# Patient Record
Sex: MALE | Race: OTHER | HISPANIC OR LATINO | URBAN - METROPOLITAN AREA
[De-identification: names, ages, dates, MRNs, and addresses within clinical notes are randomized per-mention and may not be internally consistent; named-entity substitution may affect disease eponyms.]

---

## 2020-05-10 ENCOUNTER — EMERGENCY (EMERGENCY)
Facility: HOSPITAL | Age: 45
LOS: 0 days | Discharge: HOME | End: 2020-05-10
Attending: EMERGENCY MEDICINE | Admitting: EMERGENCY MEDICINE
Payer: MEDICAID

## 2020-05-10 VITALS
RESPIRATION RATE: 18 BRPM | TEMPERATURE: 97 F | WEIGHT: 240.08 LBS | HEART RATE: 80 BPM | HEIGHT: 73 IN | DIASTOLIC BLOOD PRESSURE: 104 MMHG | SYSTOLIC BLOOD PRESSURE: 166 MMHG | OXYGEN SATURATION: 99 %

## 2020-05-10 VITALS
RESPIRATION RATE: 18 BRPM | SYSTOLIC BLOOD PRESSURE: 147 MMHG | OXYGEN SATURATION: 98 % | DIASTOLIC BLOOD PRESSURE: 90 MMHG | TEMPERATURE: 97 F | HEART RATE: 74 BPM

## 2020-05-10 DIAGNOSIS — F10.129 ALCOHOL ABUSE WITH INTOXICATION, UNSPECIFIED: ICD-10-CM

## 2020-05-10 DIAGNOSIS — I10 ESSENTIAL (PRIMARY) HYPERTENSION: ICD-10-CM

## 2020-05-10 DIAGNOSIS — F17.200 NICOTINE DEPENDENCE, UNSPECIFIED, UNCOMPLICATED: ICD-10-CM

## 2020-05-10 PROCEDURE — 99284 EMERGENCY DEPT VISIT MOD MDM: CPT

## 2020-05-10 NOTE — ED PROVIDER NOTE - CLINICAL SUMMARY MEDICAL DECISION MAKING FREE TEXT BOX
Pt found intoxicated, no marks of trauma, vss and euglycemic, observed, discharged with clear speech and steady gait after eating breakfast
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
## 2020-05-10 NOTE — ED ADULT NURSE NOTE - NSIMPLEMENTINTERV_GEN_ALL_ED
Implemented All Fall Risk Interventions:  [place] to call system. Call bell, personal items and telephone within reach. Instruct patient to call for assistance. Room bathroom lighting operational. Non-slip footwear when patient is off stretcher. Physically safe environment: no spills, clutter or unnecessary equipment. Stretcher in lowest position, wheels locked, appropriate side rails in place. Provide visual cue, wrist band, yellow gown, etc. Monitor gait and stability. Monitor for mental status changes and reorient to person, place, and time. Review medications for side effects contributing to fall risk. Reinforce activity limits and safety measures with patient and family.
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
## 2020-05-10 NOTE — ED PROVIDER NOTE - PATIENT PORTAL LINK FT
You can access the FollowMyHealth Patient Portal offered by [facility] by registering at the following website: [email or web address]. By joining [employer]’s FollowMyHealth portal, you will also be able to view your health information using other applications (apps) compatible with our system.

## 2020-05-10 NOTE — ED PROVIDER NOTE - PROGRESS NOTE DETAILS
Signed out to Dr. De La Fuente. Re-assess. AA: Tolerated po, ambulated in ED to use restroom. Will dc

## 2020-05-10 NOTE — ED PROVIDER NOTE - NSFOLLOWUPCLINICS_GEN_ALL_ED_FT
[facility]  Detox Mgmt  [address] [place] [address]  Phone: [phone]  Fax:   Follow Up Time: 1-3 Days

## 2020-05-10 NOTE — ED PROVIDER NOTE - PHYSICAL EXAMINATION
GEN: Alert & Oriented x 3, No acute distress. Calm, appropriate.  Head and Neck: Normocephalic, atraumatic.  Eyes: PERRL. No conjunctival injection. No scleral icterus.   RESP: Lungs clear to auscult bilat. no wheezes, rhonchi or rales. No retractions. Equal air entry.  CARDIO: regular rate and rhythm, no murmurs, rubs or gallops. Normal S1, S2. Radial pulses 2+ bilaterally. No lower extremity edema.  ABD: Soft, Nondistended. No rebound tenderness/guarding. No pulsatile mass. No tenderness with palpation x 4 quadrants.  MS: moving all extremities. no obvious swelling or deformities.   SKIN: no rashes/lesions, no petechiae, no ecchymosis.  NEURO: CN II-XII grossly intact. Strength + sensation intact x 4 extremities. Speech and cognition normal.

## 2020-05-10 NOTE — ED PROVIDER NOTE - NS ED ROS FT
GEN: (-) fever, (-)chils, (-) malaise  HEENT: (-) vision changes, (-) HA, (-) sore throat  CV: (-) chest pain, (-) palpitations, (-) edema  PULM: (-) cough, (-) wheezing, (-) dyspnea  GI: (-) abdominal pain,(-) Nausea, (-) Vomiting, (-) Diarrhea  NEURO: (-) weakness, (-) paresthesias, (-) syncope, (-) seizure  : (-) dysuria, (-) frequency, (-) urgency  MS: (-) back pain, (-) joint pain, (-)myalgias, (-) swelling  SKIN: (-) rashes, (-) new lesions  HEME: (-) bleeding, (-) ecchymosis  PSYCH: (-) SI, (-) HI

## 2020-05-27 ENCOUNTER — EMERGENCY (EMERGENCY)
Facility: HOSPITAL | Age: 45
LOS: 0 days | Discharge: HOME | End: 2020-05-27
Attending: EMERGENCY MEDICINE | Admitting: EMERGENCY MEDICINE
Payer: MEDICAID

## 2020-05-27 VITALS
SYSTOLIC BLOOD PRESSURE: 135 MMHG | RESPIRATION RATE: 18 BRPM | OXYGEN SATURATION: 98 % | TEMPERATURE: 98 F | DIASTOLIC BLOOD PRESSURE: 90 MMHG | HEART RATE: 86 BPM
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
## 2020-05-27 DIAGNOSIS — Z23 ENCOUNTER FOR IMMUNIZATION: ICD-10-CM

## 2020-05-27 DIAGNOSIS — S82.892A OTHER FRACTURE OF LEFT LOWER LEG, INITIAL ENCOUNTER FOR CLOSED FRACTURE: ICD-10-CM

## 2020-05-27 DIAGNOSIS — M25.579 PAIN IN UNSPECIFIED ANKLE AND JOINTS OF UNSPECIFIED FOOT: ICD-10-CM

## 2020-05-27 DIAGNOSIS — S80.212A ABRASION, LEFT KNEE, INITIAL ENCOUNTER: ICD-10-CM

## 2020-05-27 DIAGNOSIS — I10 ESSENTIAL (PRIMARY) HYPERTENSION: ICD-10-CM
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
## 2020-05-27 DIAGNOSIS — F17.200 NICOTINE DEPENDENCE, UNSPECIFIED, UNCOMPLICATED: ICD-10-CM
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
## 2020-05-27 PROCEDURE — 73620 X-RAY EXAM OF FOOT: CPT | Mod: 26,LT

## 2020-05-27 PROCEDURE — 73610 X-RAY EXAM OF ANKLE: CPT | Mod: 26,LT

## 2020-05-27 PROCEDURE — 29515 APPLICATION SHORT LEG SPLINT: CPT

## 2020-05-27 PROCEDURE — 73590 X-RAY EXAM OF LOWER LEG: CPT | Mod: 26,LT

## 2020-05-27 PROCEDURE — 99284 EMERGENCY DEPT VISIT MOD MDM: CPT | Mod: 25

## 2020-05-27 RX ORDER — IBUPROFEN 200 MG
600 TABLET ORAL ONCE
Refills: 0 | Status: COMPLETED | OUTPATIENT
Start: 2020-05-27 | End: 2020-05-27

## 2020-05-27 RX ORDER — TETANUS TOXOID, REDUCED DIPHTHERIA TOXOID AND ACELLULAR PERTUSSIS VACCINE, ADSORBED 5; 2.5; 8; 8; 2.5 [IU]/.5ML; [IU]/.5ML; UG/.5ML; UG/.5ML; UG/.5ML
0.5 SUSPENSION INTRAMUSCULAR ONCE
Refills: 0 | Status: COMPLETED | OUTPATIENT
Start: 2020-05-27 | End: 2020-05-27

## 2020-05-27 RX ADMIN — TETANUS TOXOID, REDUCED DIPHTHERIA TOXOID AND ACELLULAR PERTUSSIS VACCINE, ADSORBED 0.5 MILLILITER(S): 5; 2.5; 8; 8; 2.5 SUSPENSION INTRAMUSCULAR at 18:12

## 2020-05-27 RX ADMIN — Medication 600 MILLIGRAM(S): at 18:17

## 2020-05-27 RX ADMIN — Medication 1 TABLET(S): at 18:12

## 2020-05-27 NOTE — ED PROVIDER NOTE - PATIENT PORTAL LINK FT
You can access the FollowMyHealth Patient Portal offered by [facility] by registering at the following website: [email or web address]. By joining [employer]’s FollowMyHealth portal, you will also be able to view your health information using other applications (apps) compatible with our system.

## 2020-05-27 NOTE — ED PROVIDER NOTE - ATTENDING CONTRIBUTION TO CARE
43 yo M presents with c/o left ankle fracture.  Was seen at [facility] and found to have fracture,  Was sent here for further eval.  Pt says he fell 6 days ago.  On exam pt in NAD AAo x 3, + swelling with tenderness to left ankle, + abrasions noted, knee non tender

## 2020-05-27 NOTE — ED ADULT TRIAGE NOTE - CHIEF COMPLAINT QUOTE
Respiratory Therapist Note:    Vest    Brand: Hill-Rom - traditional Hill Rom: Frequencies 8, 9, 10 at pressure 10 then frequencies 18, 19, 20 at pressure 6.   Cough Pause: Cough Pause; Yes   Vest Garment Size: Adult Large   Last Fitting Date: winter 2019, due as needed with significant weight changes   Frequency of therapy: 14 times per week, rarely misses. Will do an extra nebulizer for respiratory illness. Discussed concern to incorporate additional vest therapy also with respiratory illness.    Concerns: none. Patient ordered new hoses at home. Has second vest jacket at home for visits with starting college.     Exercise (purposeful and aerobic for >20 minutes each session): Yes - amount : cardio 20 min / week with college friends & taking fitness class in college quarter 2.    Does this qualify as additional airway clearance: Yes    Alternative Airway Clearance: [employer] (uses prn for travel 2 x year)      Nebulized Medications   Bronchodilators: Albuterol   Mucolytic: Pulmozyme   Antibiotics: KYLEE   Additional Inhaled Medications: ICS- flovent   Spacer Use: yes    Review Cleaning: Yes. Soap and boil at school,  at home, may purchase steamer    Education and Transition Information   Correct order of inhaled medications: Yes   Mechanism of Action of inhaled medications: Yes   Frequency of inhaled medications: Yes   Dosage of inhaled medications: Yes   Other: discussed steamer use. Patient may purchase.    Home Care:   Nebulizer Cups (Brand/Type): Honey- adequate supplies   Nebulizer Compressor    Year Purchased: 50 psi, working & small white machine.    [facility], Phone: [phone], Fax: [phone]   Nebulizer Supply Company:     [facility], Phone: [phone], Fax: [phone]        Plan of Care and Goals for next visit:  Awesome job working hard at airway clearance. Keep up the great work! If interested in the [employer] vest [facility] (1  pt c/o ankle pain week ahead of time).RT contact given.

## 2020-05-27 NOTE — ED PROVIDER NOTE - PHYSICAL EXAMINATION
--EXAM--  VITAL SIGNS: I have reviewed vs documented at present.  CONSTITUTIONAL: Well-developed; well-nourished; in no acute distress.   SKIN: Warm and dry, no acute rash.   HEAD: Normocephalic; atraumatic.  EYES: PERRL, EOM intact; conjunctiva and sclera clear. No nystagmus.  ENT: No nasal discharge; airway clear.  NECK: Supple; non tender.  CARD: S1, S2, Regular rate and rhythm.   RESP: No wheezes, rales or rhonchi.  ABD: Normal bowel sounds; soft; non-distended; non-tender.  EXT: ankle pain there is swellng to left ankle with tenderness.   there are healing abrasions to left leg there is mild surrounding erythema  no calf tenderness   NEURO: Alert, oriented, grossly unremarkable. Strength 5/5 in all extremities. Sensation intact throughout.  PSYCH: Cooperative, appropriate.

## 2020-05-27 NOTE — ED PROVIDER NOTE - PROGRESS NOTE DETAILS
spoke to urgent care they sent patient for ortho. patient was not placed in splint . patient walked into ed .   patient states he was told ortho could see him . I confirmed this with urgent care.  they are aware that I will place patient in splint and sent him for outpt ortho followup patient is aware of plan will folllow with ortho

## 2020-05-27 NOTE — ED PROVIDER NOTE - CLINICAL SUMMARY MEDICAL DECISION MAKING FREE TEXT BOX
x ray results reviewed with pt . Will place splint, Rec ice, elevate and follow up with Ortho. Pt verbalizes understanding. Crutches given.

## 2020-05-27 NOTE — ED PROVIDER NOTE - CARE PROVIDER_API CALL
Trevor Springer  Orthopaedic Surgery  [address] [place] [address]  Phone: [phone]  Fax: [phone]  Follow Up Time:     Helio Reyes  Orthopaedic Surgery  [address] [place] [address]  Phone: [phone]  Fax: [phone]  Follow Up Time:

## 2020-05-27 NOTE — ED PROVIDER NOTE - OBJECTIVE STATEMENT
this is 45 yo male presents to ed for evaluation of ankle pain to left ankle after fall 6 days ago.  patient went to urgent care . patient was told to come to ed for further evaluation.

## 2020-05-27 NOTE — ED PROVIDER NOTE - NS ED ROS FT
Review of Systems:  	•	CONSTITUTIONAL - no fever, no diaphoresis, no chills  	•	SKIN - no rash  	•	HEMATOLOGIC - no bleeding, no bruising  	•	EYES - no eye pain, no blurry vision  	•	ENT - no change in hearing, no sore throat, no ear pain or tinnitus  	•	RESPIRATORY - no shortness of breath, no cough  	•	CARDIAC - no chest pain, no palpitations  	•	GI - no abd pain, no nausea, no vomiting, no diarrhea, no constipation  	•	GENITO-URINARY - no discharge, no dysuria; no hematuria, no increased urinary frequency  	•	MUSCULOSKELETAL - left ankle pain , no swelling, no redness  	•	NEUROLOGIC - no weakness, no headache, no paresthesias, no LOC  	•	PSYCH - no anxiety, non suicidal, non homicidal, no hallucination, no depression

## 2020-06-01 ENCOUNTER — OUTPATIENT (OUTPATIENT)
Dept: OUTPATIENT SERVICES | Facility: HOSPITAL | Age: 45
LOS: 1 days | End: 2020-06-01
Payer: MEDICAID

## 2020-06-05 ENCOUNTER — EMERGENCY (EMERGENCY)
Facility: HOSPITAL | Age: 45
LOS: 0 days | Discharge: HOME | End: 2020-06-05
Attending: EMERGENCY MEDICINE | Admitting: EMERGENCY MEDICINE
Payer: MEDICAID

## 2020-06-05 VITALS
WEIGHT: 240.08 LBS | RESPIRATION RATE: 16 BRPM | OXYGEN SATURATION: 97 % | TEMPERATURE: 98 F | HEART RATE: 87 BPM | DIASTOLIC BLOOD PRESSURE: 116 MMHG | SYSTOLIC BLOOD PRESSURE: 185 MMHG | HEIGHT: 74 IN
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
## 2020-06-05 DIAGNOSIS — S82.402A UNSPECIFIED FRACTURE OF SHAFT OF LEFT FIBULA, INITIAL ENCOUNTER FOR CLOSED FRACTURE: ICD-10-CM
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
## 2020-06-05 PROCEDURE — 99284 EMERGENCY DEPT VISIT MOD MDM: CPT

## 2020-06-05 NOTE — ED PROVIDER NOTE - CARE PROVIDERS DIRECT ADDRESSES
[email or web address]

## 2020-06-05 NOTE — ED PROVIDER NOTE - NS ED ROS FT
Review of Systems:  •	CONSTITUTIONAL - No fever, No diaphoresis, No weight change  •	SKIN - No rash  •	HEMATOLOGIC - No abnormal bleeding or bruising  •	EYES - No eye pain, No blurred vision  •	ENT - No change in hearing, No sore throat, No neck pain, No rhinorrhea, No ear pain  •	RESPIRATORY - No shortness of breath, No cough  •	CARDIAC -No chest pain, No palpitations  •	GI - No abdominal pain, No nausea, No vomiting, No diarrhea, No constipation, No bright red blood per rectum or melena. No flank pain  •                 - No dysuria, frequency, hematuria.   •	ENDO - No polydypsia, No polyuria, No heat/cold intolerance  •	MUSCULOSKELETAL - + ankle pain, No swelling, No back pain  •	NEUROLOGIC - No numbness, No focal weakness, No headache, No dizziness  All other systems negative, unless specified in HPI

## 2020-06-05 NOTE — ED PROVIDER NOTE - CLINICAL SUMMARY MEDICAL DECISION MAKING FREE TEXT BOX
In my opinion, out patient treatment and follow up are appropriate. specific f/u arranged in anticipation of operative intervention.

## 2020-06-05 NOTE — ED PROVIDER NOTE - OBJECTIVE STATEMENT
45 y/o male with PMH of HTN who presents to ED for left fibula fracture. PT states he has been having difficulty following up with orthopedist and was told to come back to ED for ortho eval. No numbness/tingling or weakness.

## 2020-06-05 NOTE — ED PROVIDER NOTE - PHYSICAL EXAMINATION
CONSTITUTIONAL: Well-developed; well-nourished; in no acute distress.   SKIN: warm, dry  HEAD: Normocephalic; atraumatic.  EYES: no conjunctival injection. PERRL.   ENT: No nasal discharge; airway clear.  NECK: Supple; non tender.  CARD: S1, S2 normal; no murmurs, gallops, or rubs. Regular rate and rhythm.   RESP: No wheezes, rales or rhonchi.  ABD: soft ntnd  EXT: LLE: + cast in palce. othwerwise Normal ROM.  No clubbing, cyanosis or edema.   LYMPH: No acute cervical adenopathy.  NEURO: Alert, oriented, grossly unremarkable  PSYCH: Cooperative, appropriate.

## 2020-06-05 NOTE — ED PROVIDER NOTE - PATIENT PORTAL LINK FT
You can access the FollowMyHealth Patient Portal offered by [facility] by registering at the following website: [email or web address]. By joining [employer]’s FollowMyHealth portal, you will also be able to view your health information using other applications (apps) compatible with our system.

## 2020-06-05 NOTE — ED PROVIDER NOTE - CARE PROVIDER_API CALL
BECCA KWON  ORTHOPAEDIC SURGERY  [address] [place] [address]  Phone: [phone]  Fax: [phone]  Follow Up Time: 1-3 Days

## 2020-06-05 NOTE — ED PROVIDER NOTE - NSFOLLOWUPINSTRUCTIONS_ED_ALL_ED_FT
Declined
Dr. Lu will call you to discuss surgery which will be on Thursday.       Fracture    A fracture is a break in one of your bones. This can occur from a variety of injuries, especially traumatic ones. Symptoms include pain, bruising, or swelling. Do not use the injured limb. If a fracture is in one of the bones below your waist, do not put weight on that limb unless instructed to do so by your healthcare provider. Crutches or a cane may have been provided. A splint or cast may have been applied by your health care provider. Make sure to keep it dry and follow up with an orthopedist as instructed.    SEEK IMMEDIATE MEDICAL CARE IF YOU HAVE ANY OF THE FOLLOWING SYMPTOMS: numbness, tingling, increasing pain, or weakness in any part of the injured limb.

## 2020-06-08 ENCOUNTER — OUTPATIENT (OUTPATIENT)
Dept: OUTPATIENT SERVICES | Facility: HOSPITAL | Age: 45
LOS: 1 days | Discharge: HOME | End: 2020-06-08
Payer: MEDICAID

## 2020-06-08 VITALS
HEART RATE: 74 BPM | TEMPERATURE: 98 F | OXYGEN SATURATION: 97 % | WEIGHT: 244.93 LBS | SYSTOLIC BLOOD PRESSURE: 156 MMHG | DIASTOLIC BLOOD PRESSURE: 96 MMHG | HEIGHT: 74 IN | RESPIRATION RATE: 16 BRPM

## 2020-06-08 DIAGNOSIS — S82.892A OTHER FRACTURE OF LEFT LOWER LEG, INITIAL ENCOUNTER FOR CLOSED FRACTURE: ICD-10-CM

## 2020-06-08 DIAGNOSIS — S82.892D OTHER FRACTURE OF LEFT LOWER LEG, SUBSEQUENT ENCOUNTER FOR CLOSED FRACTURE WITH ROUTINE HEALING: ICD-10-CM

## 2020-06-08 DIAGNOSIS — Z01.818 ENCOUNTER FOR OTHER PREPROCEDURAL EXAMINATION: ICD-10-CM

## 2020-06-08 LAB
ALBUMIN SERPL ELPH-MCNC: 3.9 G/DL — SIGNIFICANT CHANGE UP (ref 3.5–5.2)
ALP SERPL-CCNC: 88 U/L — SIGNIFICANT CHANGE UP (ref 30–115)
ALT FLD-CCNC: 19 U/L — SIGNIFICANT CHANGE UP (ref 0–41)
ANION GAP SERPL CALC-SCNC: 15 MMOL/L — HIGH (ref 7–14)
APPEARANCE UR: CLEAR — SIGNIFICANT CHANGE UP
AST SERPL-CCNC: 16 U/L — SIGNIFICANT CHANGE UP (ref 0–41)
BACTERIA # UR AUTO: NEGATIVE — SIGNIFICANT CHANGE UP
BASOPHILS # BLD AUTO: 0.12 K/UL — SIGNIFICANT CHANGE UP (ref 0–0.2)
BASOPHILS NFR BLD AUTO: 1 % — SIGNIFICANT CHANGE UP (ref 0–1)
BILIRUB SERPL-MCNC: <0.2 MG/DL — SIGNIFICANT CHANGE UP (ref 0.2–1.2)
BILIRUB UR-MCNC: NEGATIVE — SIGNIFICANT CHANGE UP
BUN SERPL-MCNC: 20 MG/DL — SIGNIFICANT CHANGE UP (ref 10–20)
CALCIUM SERPL-MCNC: 9.3 MG/DL — SIGNIFICANT CHANGE UP (ref 8.5–10.1)
CHLORIDE SERPL-SCNC: 102 MMOL/L — SIGNIFICANT CHANGE UP (ref 98–110)
CO2 SERPL-SCNC: 22 MMOL/L — SIGNIFICANT CHANGE UP (ref 17–32)
COLOR SPEC: SIGNIFICANT CHANGE UP
CREAT SERPL-MCNC: 1 MG/DL — SIGNIFICANT CHANGE UP (ref 0.7–1.5)
DIFF PNL FLD: NEGATIVE — SIGNIFICANT CHANGE UP
EOSINOPHIL # BLD AUTO: 0.42 K/UL — SIGNIFICANT CHANGE UP (ref 0–0.7)
EOSINOPHIL NFR BLD AUTO: 3.4 % — SIGNIFICANT CHANGE UP (ref 0–8)
EPI CELLS # UR: 1 /HPF — SIGNIFICANT CHANGE UP (ref 0–5)
GLUCOSE SERPL-MCNC: 96 MG/DL — SIGNIFICANT CHANGE UP (ref 70–99)
GLUCOSE UR QL: NEGATIVE — SIGNIFICANT CHANGE UP
HCT VFR BLD CALC: 43.6 % — SIGNIFICANT CHANGE UP (ref 42–52)
HGB BLD-MCNC: 14.2 G/DL — SIGNIFICANT CHANGE UP (ref 14–18)
HYALINE CASTS # UR AUTO: 1 /LPF — SIGNIFICANT CHANGE UP (ref 0–7)
IMM GRANULOCYTES NFR BLD AUTO: 0.9 % — HIGH (ref 0.1–0.3)
KETONES UR-MCNC: NEGATIVE — SIGNIFICANT CHANGE UP
LEUKOCYTE ESTERASE UR-ACNC: ABNORMAL
LYMPHOCYTES # BLD AUTO: 29.6 % — SIGNIFICANT CHANGE UP (ref 20.5–51.1)
LYMPHOCYTES # BLD AUTO: 3.61 K/UL — HIGH (ref 1.2–3.4)
MCHC RBC-ENTMCNC: 28.7 PG — SIGNIFICANT CHANGE UP (ref 27–31)
MCHC RBC-ENTMCNC: 32.6 G/DL — SIGNIFICANT CHANGE UP (ref 32–37)
MCV RBC AUTO: 88.3 FL — SIGNIFICANT CHANGE UP (ref 80–94)
MONOCYTES # BLD AUTO: 0.81 K/UL — HIGH (ref 0.1–0.6)
MONOCYTES NFR BLD AUTO: 6.6 % — SIGNIFICANT CHANGE UP (ref 1.7–9.3)
NEUTROPHILS # BLD AUTO: 7.14 K/UL — HIGH (ref 1.4–6.5)
NEUTROPHILS NFR BLD AUTO: 58.5 % — SIGNIFICANT CHANGE UP (ref 42.2–75.2)
NITRITE UR-MCNC: NEGATIVE — SIGNIFICANT CHANGE UP
NRBC # BLD: 0 /100 WBCS — SIGNIFICANT CHANGE UP (ref 0–0)
PH UR: 6 — SIGNIFICANT CHANGE UP (ref 5–8)
PLATELET # BLD AUTO: 429 K/UL — HIGH (ref 130–400)
POTASSIUM SERPL-MCNC: 4.7 MMOL/L — SIGNIFICANT CHANGE UP (ref 3.5–5)
POTASSIUM SERPL-SCNC: 4.7 MMOL/L — SIGNIFICANT CHANGE UP (ref 3.5–5)
PROT SERPL-MCNC: 7.5 G/DL — SIGNIFICANT CHANGE UP (ref 6–8)
PROT UR-MCNC: NEGATIVE — SIGNIFICANT CHANGE UP
RBC # BLD: 4.94 M/UL — SIGNIFICANT CHANGE UP (ref 4.7–6.1)
RBC # FLD: 13.9 % — SIGNIFICANT CHANGE UP (ref 11.5–14.5)
RBC CASTS # UR COMP ASSIST: 1 /HPF — SIGNIFICANT CHANGE UP (ref 0–4)
SODIUM SERPL-SCNC: 139 MMOL/L — SIGNIFICANT CHANGE UP (ref 135–146)
SP GR SPEC: 1.02 — SIGNIFICANT CHANGE UP (ref 1.01–1.02)
UROBILINOGEN FLD QL: SIGNIFICANT CHANGE UP
WBC # BLD: 12.21 K/UL — HIGH (ref 4.8–10.8)
WBC # FLD AUTO: 12.21 K/UL — HIGH (ref 4.8–10.8)
WBC UR QL: 18 /HPF — HIGH (ref 0–5)

## 2020-06-08 PROCEDURE — 93010 ELECTROCARDIOGRAM REPORT: CPT

## 2020-06-08 NOTE — H&P PST ADULT - NSICDXPASTMEDICALHX_GEN_ALL_CORE_FT
PAST MEDICAL HISTORY:  Eye problem right eye accident age 2 "lost vision"    Hypertension no taking meds for 4-5 months PAST MEDICAL HISTORY:  Eye problem right eye accident age 2 "lost vision"    Hypertension not taking meds for 4-5 months "i ran out"
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
## 2020-06-09 DIAGNOSIS — Z71.89 OTHER SPECIFIED COUNSELING: ICD-10-CM

## 2020-06-09 PROBLEM — I10 ESSENTIAL (PRIMARY) HYPERTENSION: Chronic | Status: ACTIVE | Noted: 2020-05-10

## 2020-06-15 PROBLEM — H57.9 UNSPECIFIED DISORDER OF EYE AND ADNEXA: Chronic | Status: ACTIVE | Noted: 2020-06-08

## 2020-06-15 PROCEDURE — G9001: CPT

## 2020-06-17 NOTE — ASU PATIENT PROFILE, ADULT - PMH
Ankle fracture  left  Eye problem  right eye accident age 2 "lost vision"  Hypertension  not taking meds for 4-5 months "i ran out"

## 2020-06-18 ENCOUNTER — OUTPATIENT (OUTPATIENT)
Dept: OUTPATIENT SERVICES | Facility: HOSPITAL | Age: 45
LOS: 1 days | Discharge: HOME | End: 2020-06-18

## 2020-06-18 VITALS
TEMPERATURE: 98 F | RESPIRATION RATE: 95 BRPM | HEART RATE: 69 BPM | SYSTOLIC BLOOD PRESSURE: 124 MMHG | OXYGEN SATURATION: 98 % | DIASTOLIC BLOOD PRESSURE: 62 MMHG

## 2020-06-18 VITALS
OXYGEN SATURATION: 98 % | DIASTOLIC BLOOD PRESSURE: 87 MMHG | HEIGHT: 74 IN | TEMPERATURE: 99 F | WEIGHT: 244.93 LBS | RESPIRATION RATE: 20 BRPM | SYSTOLIC BLOOD PRESSURE: 140 MMHG | HEART RATE: 89 BPM

## 2020-06-18 DIAGNOSIS — Z90.49 ACQUIRED ABSENCE OF OTHER SPECIFIED PARTS OF DIGESTIVE TRACT: Chronic | ICD-10-CM

## 2020-06-18 DIAGNOSIS — Z98.890 OTHER SPECIFIED POSTPROCEDURAL STATES: Chronic | ICD-10-CM

## 2020-06-18 RX ORDER — HYDROMORPHONE HYDROCHLORIDE 2 MG/ML
0.5 INJECTION INTRAMUSCULAR; INTRAVENOUS; SUBCUTANEOUS
Refills: 0 | Status: DISCONTINUED | OUTPATIENT
Start: 2020-06-18 | End: 2020-06-18

## 2020-06-18 RX ORDER — ONDANSETRON 8 MG/1
4 TABLET, FILM COATED ORAL ONCE
Refills: 0 | Status: DISCONTINUED | OUTPATIENT
Start: 2020-06-18 | End: 2020-07-03

## 2020-06-18 RX ORDER — CEPHALEXIN 500 MG
1 CAPSULE ORAL
Qty: 28 | Refills: 0
Start: 2020-06-18 | End: 2020-06-24

## 2020-06-18 RX ORDER — OXYCODONE HYDROCHLORIDE 5 MG/1
5 TABLET ORAL ONCE
Refills: 0 | Status: DISCONTINUED | OUTPATIENT
Start: 2020-06-18 | End: 2020-06-18

## 2020-06-18 RX ORDER — SODIUM CHLORIDE 9 MG/ML
1000 INJECTION, SOLUTION INTRAVENOUS
Refills: 0 | Status: DISCONTINUED | OUTPATIENT
Start: 2020-06-18 | End: 2020-07-03

## 2020-06-18 RX ORDER — ASPIRIN/CALCIUM CARB/MAGNESIUM 324 MG
1 TABLET ORAL
Qty: 60 | Refills: 0
Start: 2020-06-18 | End: 2020-07-17

## 2020-06-18 RX ORDER — IBUPROFEN 200 MG
1 TABLET ORAL
Qty: 90 | Refills: 0
Start: 2020-06-18 | End: 2020-07-17

## 2020-06-18 RX ADMIN — SODIUM CHLORIDE 100 MILLILITER(S): 9 INJECTION, SOLUTION INTRAVENOUS at 12:40

## 2020-06-18 NOTE — ASU DISCHARGE PLAN (ADULT/PEDIATRIC) - CALL YOUR DOCTOR IF YOU HAVE ANY OF THE FOLLOWING:
Pain not relieved by Medications/Fever greater than (need to indicate Fahrenheit or Celsius)/Numbness, tingling, color or temperature change to extremity/Bleeding that does not stop/Swelling that gets worse/Wound/Surgical Site with redness, or foul smelling discharge or pus

## 2020-06-18 NOTE — ASU DISCHARGE PLAN (ADULT/PEDIATRIC) - PROVIDER TOKENS
FREE:[LAST:[brian],FIRST:[juan],PHONE:[phone]],FAX:[(   )    -],ADDRESS:[[address] [place] [address]],FOLLOWUP:[2 weeks],ESTABLISHEDPATIENT:[T]]

## 2020-06-18 NOTE — ASU DISCHARGE PLAN (ADULT/PEDIATRIC) - ASU DC SPECIAL INSTRUCTIONSFT
nonweightbearing left lower extremity.  elevate, ice, pain control.  baby aspirin twice a day for DVT prophylaxis.  1 week of antibiotics.  follow up in 2 weeks for suture removal and xrays.

## 2020-06-18 NOTE — ASU DISCHARGE PLAN (ADULT/PEDIATRIC) - CARE PROVIDER_API CALL
juan torres  [address]  Phone: [phone]  Fax: (   )    -  Established Patient  Follow Up Time: 2 weeks

## 2020-06-18 NOTE — ASU PREOP CHECKLIST - IDENTIFICATION BAND VERIFIED
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  2 mm left and 3mm right cysts immediately adjacent to frontal horns of the lateral ventricles.   head ultrasound recommended. -Bri Luna RNC

## 2020-06-18 NOTE — PRE-ANESTHESIA EVALUATION ADULT - NSANTHOSAYNRD_GEN_A_CORE
No. CHUCKY screening performed.  STOP BANG Legend: 0-2 = LOW Risk; 3-4 = INTERMEDIATE Risk; 5-8 = HIGH Risk/never tested, see screening tool

## 2020-06-18 NOTE — BRIEF OPERATIVE NOTE - NSICDXBRIEFPOSTOP_GEN_ALL_CORE_FT
POST-OP DIAGNOSIS:  Ankle syndesmosis disruption, left, initial encounter 18-Jun-2020 12:16:55  Yolande Lu  Disp fx of lateral malleolus of left fibula, init 18-Jun-2020 12:13:19  Yolande Lu

## 2020-06-18 NOTE — BRIEF OPERATIVE NOTE - NSICDXBRIEFPREOP_GEN_ALL_CORE_FT
PRE-OP DIAGNOSIS:  Disp fx of lateral malleolus of left fibula, init 18-Jun-2020 12:13:12  Yolande Lu

## 2020-06-18 NOTE — BRIEF OPERATIVE NOTE - NSICDXBRIEFPROCEDURE_GEN_ALL_CORE_FT
PROCEDURES:  Repair of syndesmosis of left ankle 18-Jun-2020 12:12:35  Yolande Lu  Open reduction and internal fixation (ORIF) of fracture of lateral malleolus of left fibula 18-Jun-2020 12:12:23  Yolande Lu

## 2020-06-18 NOTE — PACU DISCHARGE NOTE - THE ANESTHESIA ORDERS USED IN THE PACU ORDER SET WILL BE DISCONTINUED UPON TRANSFER OF THIS PATIENT
Called patient to report her labs are improved from last year labs. No changes will be made at this time.  Patient states understanding and is happy to received a call.  =================================================      Component      Latest Ref Rng & Units 2/4/2020   HGB      12.0 - 15.5 g/dL 12.4   HCT      36.0 - 46.5 % 39.2 Statement Selected
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
## 2020-06-23 DIAGNOSIS — F17.210 NICOTINE DEPENDENCE, CIGARETTES, UNCOMPLICATED: ICD-10-CM
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
## 2020-06-23 DIAGNOSIS — I10 ESSENTIAL (PRIMARY) HYPERTENSION: ICD-10-CM

## 2020-06-23 DIAGNOSIS — S82.62XA DISPLACED FRACTURE OF LATERAL MALLEOLUS OF LEFT FIBULA, INITIAL ENCOUNTER FOR CLOSED FRACTURE: ICD-10-CM

## 2020-08-18 NOTE — H&P PST ADULT - PRO TOBACCO QUIT READY
RHEUMATOLOGY FOLLOW UP VISIT    Name: Claudia Wiley  : 1954     Referred by: Dheeraj Garcia MD    CHIEF COMPLAINT   Chief Complaint   Patient presents with   • Lupus     4 month follow-up visit.  No pain reported.       HISTORY OF PRESENT ILLNESS: This is a 66-year-old female history of Sjogren's and lupus. She was initially diagnosed in  in [facility]. At that time, she presented with the symptoms of generalized rash, fever, arthralgia, photosensitivity, malar rash and hair loss. Initial labs showed positive MARY ELLEN with low complements. At that time, anti-Juarez was also positive. In addition, she had positive anti-SSA as well as anti-SSB. Anti-SSB was consistent with diagnosis of Sjogren's syndrome; however, anti-double stranded DNA was negative.  However surprisingly her more recent MARY ELLEN was negative now,   In , she started on Hydroxychloroquine. However, in  ophthalmologist was concerned about possible Plaquenil toxicity and it was therefore discontinued. She was evaluated by another ophthalmologist for second opinion, and he felt that she did not have Hydroxychloroquine toxicity. However, she decided not to take Hydroxychloroquine.  However recently she called me about hydroxychloroquine again after the recent publicity ie trials on Covid 19, so she started taking it again but again stopped it after about a month and she could not get an ophthalmology evaluation.  She has therefore been on prednisone 9 mg since the last few months  Previous attempts to lower the Prednisone had not been successful. She has also been reluctant to start any other medications for rheumatological disorders such as lupus including Methotrexate, Azathioprine or other immunosuppressants. Patient has osteoporosis; however, does not want to undergo any treatment for it. She has a history of heel fracture.  She is not doing fairly well with no significant joint pain or joint swelling as mild achiness and  fatigue.  No malar rash now. No raynauds now.    REVIEW OF SYSTEMS  Constitutional: no fevers or chills no weight loss, has fatigue  EYES: has dry eyes, no blurred vision, no diplopia, no history of iritis  ENT:  has dry mouth, no vision impairment, no oral ulcers, no dysphagia, no chronic sinus disease   Cardiovascular: no chest pain orthopnea no PND no palpitations  Respiratory: no cough no shortness of breath no wheezing no stridor no dyspnea on exertion  GI: no nausea, no vomiting, no diarrhea, no constipation no heartburn  :no dysuria urgency no hematuria  YAMEL: as above no raynauds  Heme:no history of deep venous thrombosis or pulmonary embolism no anemia, no swollen glands  CNS: + tingling + numbness,no weakness, no ataxia  PSY :no anxiety or depression  INTEGUMENTARY: no hair loss, no rashes  ENDO: No polyuria, no polydipsia    Past Medical History:   Diagnosis Date   • Acid reflux    • Breast cancer (CMS/HCC)    • Depression    • Hyperlipidemia    • Hypothyroidism    • Lupus (systemic lupus erythematosus) (CMS/HCC)    • Sjogren's syndrome (CMS/HCC)         Past Surgical History:   Procedure Laterality Date   • Breast surgery     • Hysterectomy         Social History     Tobacco Use   • Smoking status: Never Smoker   • Smokeless tobacco: Never Used   Substance Use Topics   • Alcohol use: No     Frequency: Never   • Drug use: Never       Current Outpatient Medications   Medication Sig Note Last Dose   • levothyroxine 100 MCG tablet Take 1 tablet by mouth daily.  Taking at Unknown time   • predniSONE (DELTASONE) 1 MG tablet Take 4 tablets by mouth daily.  Taking at Unknown time   • predniSONE (DELTASONE) 5 MG tablet Take 1 tablet by mouth daily.  Taking at Unknown time   • doxepin (SINEQUAN) 10 MG capsule 4caps po qhs  Taking at Unknown time   • omeprazole (PRILOSEC) 40 MG capsule Take 1 capsule by mouth daily.  Taking at Unknown time   • cholecalciferol (VITAMIN D3) 1000 UNITS tablet   Taking at Unknown time    • Loratadine (CLARITIN PO)   Taking at Unknown time   • Multiple Vitamins-Minerals (MULTIVITAMIN ADULT PO) Take 1 tablet by mouth.  Taking at Unknown time   • Acetaminophen (TYLENOL PO)   Taking at Unknown time   • desonide (DESOWEN) 0.05 % lotion   Taking at Unknown time   • fluticasone (VERAMYST) 27.5 MCG/SPRAY nasal spray Inhale 2 sprays into the lungs.  Taking at Unknown time   • Multiple Vitamins-Iron (MULTI-DAY PLUS IRON) Tab Take by mouth daily.  Taking at Unknown time   • Cetirizine HCl (ZYRTEC ALLERGY) 10 MG Cap   Taking at Unknown time   • fluticasone (FLONASE) 50 MCG/ACT nasal spray Spray 2 sprays in each nostril daily.  Taking at Unknown time   • DiphenhydrAMINE HCl (BENADRYL PO)  2/18/2019: [facility] med transfer: no issue Taking at Unknown time   • hydroxychloroquine (PLAQUENIL) 200 MG tablet Take 1 tablet by mouth 2 times daily.  Not Taking at Unknown time   • albuterol (PROVENTIL HFA) 108 (90 Base) MCG/ACT inhaler Inhale 2 puffs into the lungs.  Not Taking at Unknown time   • Glycerin-Hypromellose- 0.2-0.2-1 % Solution   Not Taking at Unknown time     No current facility-administered medications for this visit.            PHYSICAL EXAM    Vitals:    08/18/20 1238   BP: 129/86   Pulse: (!) 108   Resp: 16   SpO2: 98%   Weight: 63.2 kg (139 lb 5.3 oz)   Height: 5' 1\" (1.549 m)   PainSc:  0   Cushingoid  Body mass index is 26.33 kg/m².     Skin: no ulcers, no malar rash, no petechia no purpura.    Head and Face: Atraumatic no deformities normocephalic normal facies cushingoid  Eyes: Pink conjunctiva, anicteric sclera, no periorbital swelling, no ptosis, pupils reactive to light, extraocular muscles intact.  has dry eyes.  Puffiness around her eyes    ENT: No oral ulcers, no nasal ulcers,  no sinus tenderness, no malar rash, no temporal artery tenderness, no oral thrush, has dry mouth, no oral ulcers.  No TMJ tenderness     Neck: Fairly good range of motion of C-spine, no paracervical tenderness, no  goiter, no adenopathy, no supraclavicular masses.    Cardiac exam: S1-S2 regular, no murmurs.    Lungs: clear, no rales or wheezing, no abnormal breath sounds, good breath sounds bilaterally.    Abdomen: no hepatomegaly or splenomegaly or tenderness, no masses, no ascites.    Back: no SI joint tenderness, no paralumbar tenderness  Musculoskeletal exam:  Good range of motion bilateral shoulder joint with no tenderness  Bilateral elbows no synovitis or tenderness  Bilateral wrist joints no synovitis or tenderness  Bilateral MCP joints no synovitis or tenderness  Bilateral PIP joints no synovitis and tenderness  Bilateral DIP joints no synovitis or tenderness  Both knees no effusion warmth or tenderness no knee instability  Both ankles no synovitis or tenderness  Bilateral MTP joints no synovitis or tenderness no dactylitis  Good range of motion bilateral hip joints with no tenderness    Neurological exam: Normal gait, normal motor strength in upper and lower extremity, normal muscle tone, no tremors alert oriented x3. good bilateral hand , no muscle atrophy.      LABs  CBC WBC 5.1, hemoglobin 13, platelet count 2 49,000  Urine analysis no proteins  Normal C3 and C4 complements   Negative MARY ELLEN        ASSESSMENT:   SLE   Sjogren's  Osteoporosis but declines treatment  Fibromyalgia-this may also be causing her achiness    PLAN  Labs reviewed all stable  In fact MARY ELLEN now negative  Can start tapering prednisone at least 8 mg daily  If she is doing well in a month and can reduce it further to 7 mg daily  She will make a visit with her ophthalmologist again to make sure there are no contraindications before restarting hydroxychloroquine.  Return to clinic in 6 months       No orders of the defined types were placed in this encounter.        Risks of medical conditions and side effects of medication were discussed.  Medical compliance with plan discussed and risks of non-compliance reviewed.    Patient education completed  on disease process, etiology & prognosis.    Patient expresses understanding of the plan.    Return to clinic as clinically indicated as discussed with patient who verbalized understanding of & agreement with the plan.       Garfield Jimenez MD               not motivated to quit

## 2021-02-06 ENCOUNTER — EMERGENCY (EMERGENCY)
Facility: HOSPITAL | Age: 46
LOS: 0 days | Discharge: HOME | End: 2021-02-07
Attending: EMERGENCY MEDICINE | Admitting: EMERGENCY MEDICINE
Payer: MEDICAID

## 2021-02-06 VITALS
HEART RATE: 97 BPM | SYSTOLIC BLOOD PRESSURE: 147 MMHG | RESPIRATION RATE: 18 BRPM | WEIGHT: 259.93 LBS | DIASTOLIC BLOOD PRESSURE: 89 MMHG | HEIGHT: 74 IN | OXYGEN SATURATION: 97 % | TEMPERATURE: 98 F

## 2021-02-06 DIAGNOSIS — Z90.49 ACQUIRED ABSENCE OF OTHER SPECIFIED PARTS OF DIGESTIVE TRACT: ICD-10-CM

## 2021-02-06 DIAGNOSIS — Z87.81 PERSONAL HISTORY OF (HEALED) TRAUMATIC FRACTURE: ICD-10-CM

## 2021-02-06 DIAGNOSIS — R07.89 OTHER CHEST PAIN: ICD-10-CM

## 2021-02-06 DIAGNOSIS — Z98.890 OTHER SPECIFIED POSTPROCEDURAL STATES: Chronic | ICD-10-CM

## 2021-02-06 DIAGNOSIS — Z90.49 ACQUIRED ABSENCE OF OTHER SPECIFIED PARTS OF DIGESTIVE TRACT: Chronic | ICD-10-CM

## 2021-02-06 DIAGNOSIS — Z79.891 LONG TERM (CURRENT) USE OF OPIATE ANALGESIC: ICD-10-CM

## 2021-02-06 DIAGNOSIS — Z20.822 CONTACT WITH AND (SUSPECTED) EXPOSURE TO COVID-19: ICD-10-CM

## 2021-02-06 DIAGNOSIS — F17.210 NICOTINE DEPENDENCE, CIGARETTES, UNCOMPLICATED: ICD-10-CM

## 2021-02-06 DIAGNOSIS — Z79.899 OTHER LONG TERM (CURRENT) DRUG THERAPY: ICD-10-CM

## 2021-02-06 DIAGNOSIS — I10 ESSENTIAL (PRIMARY) HYPERTENSION: ICD-10-CM

## 2021-02-06 DIAGNOSIS — Z79.82 LONG TERM (CURRENT) USE OF ASPIRIN: ICD-10-CM

## 2021-02-06 DIAGNOSIS — Z98.890 OTHER SPECIFIED POSTPROCEDURAL STATES: ICD-10-CM

## 2021-02-06 DIAGNOSIS — I25.10 ATHEROSCLEROTIC HEART DISEASE OF NATIVE CORONARY ARTERY WITHOUT ANGINA PECTORIS: ICD-10-CM

## 2021-02-06 LAB
ALBUMIN SERPL ELPH-MCNC: 4 G/DL — SIGNIFICANT CHANGE UP (ref 3.5–5.2)
ALP SERPL-CCNC: 80 U/L — SIGNIFICANT CHANGE UP (ref 30–115)
ALT FLD-CCNC: 15 U/L — SIGNIFICANT CHANGE UP (ref 0–41)
ANION GAP SERPL CALC-SCNC: 12 MMOL/L — SIGNIFICANT CHANGE UP (ref 7–14)
AST SERPL-CCNC: 17 U/L — SIGNIFICANT CHANGE UP (ref 0–41)
BASOPHILS # BLD AUTO: 0.07 K/UL — SIGNIFICANT CHANGE UP (ref 0–0.2)
BASOPHILS NFR BLD AUTO: 0.6 % — SIGNIFICANT CHANGE UP (ref 0–1)
BILIRUB SERPL-MCNC: <0.2 MG/DL — SIGNIFICANT CHANGE UP (ref 0.2–1.2)
BUN SERPL-MCNC: 19 MG/DL — SIGNIFICANT CHANGE UP (ref 10–20)
CALCIUM SERPL-MCNC: 9.1 MG/DL — SIGNIFICANT CHANGE UP (ref 8.5–10.1)
CHLORIDE SERPL-SCNC: 103 MMOL/L — SIGNIFICANT CHANGE UP (ref 98–110)
CK SERPL-CCNC: 261 U/L — HIGH (ref 0–225)
CO2 SERPL-SCNC: 24 MMOL/L — SIGNIFICANT CHANGE UP (ref 17–32)
CREAT SERPL-MCNC: 1.1 MG/DL — SIGNIFICANT CHANGE UP (ref 0.7–1.5)
EOSINOPHIL # BLD AUTO: 0.38 K/UL — SIGNIFICANT CHANGE UP (ref 0–0.7)
EOSINOPHIL NFR BLD AUTO: 3.3 % — SIGNIFICANT CHANGE UP (ref 0–8)
GLUCOSE SERPL-MCNC: 106 MG/DL — HIGH (ref 70–99)
HCT VFR BLD CALC: 37.9 % — LOW (ref 42–52)
HGB BLD-MCNC: 13 G/DL — LOW (ref 14–18)
HIV 1 & 2 AB SERPL IA.RAPID: SIGNIFICANT CHANGE UP
IMM GRANULOCYTES NFR BLD AUTO: 0.3 % — SIGNIFICANT CHANGE UP (ref 0.1–0.3)
LYMPHOCYTES # BLD AUTO: 27.8 % — SIGNIFICANT CHANGE UP (ref 20.5–51.1)
LYMPHOCYTES # BLD AUTO: 3.19 K/UL — SIGNIFICANT CHANGE UP (ref 1.2–3.4)
MAGNESIUM SERPL-MCNC: 1.9 MG/DL — SIGNIFICANT CHANGE UP (ref 1.8–2.4)
MCHC RBC-ENTMCNC: 29.3 PG — SIGNIFICANT CHANGE UP (ref 27–31)
MCHC RBC-ENTMCNC: 34.3 G/DL — SIGNIFICANT CHANGE UP (ref 32–37)
MCV RBC AUTO: 85.6 FL — SIGNIFICANT CHANGE UP (ref 80–94)
MONOCYTES # BLD AUTO: 0.64 K/UL — HIGH (ref 0.1–0.6)
MONOCYTES NFR BLD AUTO: 5.6 % — SIGNIFICANT CHANGE UP (ref 1.7–9.3)
NEUTROPHILS # BLD AUTO: 7.14 K/UL — HIGH (ref 1.4–6.5)
NEUTROPHILS NFR BLD AUTO: 62.4 % — SIGNIFICANT CHANGE UP (ref 42.2–75.2)
NRBC # BLD: 0 /100 WBCS — SIGNIFICANT CHANGE UP (ref 0–0)
PLATELET # BLD AUTO: 288 K/UL — SIGNIFICANT CHANGE UP (ref 130–400)
POTASSIUM SERPL-MCNC: 3.9 MMOL/L — SIGNIFICANT CHANGE UP (ref 3.5–5)
POTASSIUM SERPL-SCNC: 3.9 MMOL/L — SIGNIFICANT CHANGE UP (ref 3.5–5)
PROT SERPL-MCNC: 7 G/DL — SIGNIFICANT CHANGE UP (ref 6–8)
RBC # BLD: 4.43 M/UL — LOW (ref 4.7–6.1)
RBC # FLD: 13.1 % — SIGNIFICANT CHANGE UP (ref 11.5–14.5)
SODIUM SERPL-SCNC: 139 MMOL/L — SIGNIFICANT CHANGE UP (ref 135–146)
TROPONIN T SERPL-MCNC: <0.01 NG/ML — SIGNIFICANT CHANGE UP
WBC # BLD: 11.46 K/UL — HIGH (ref 4.8–10.8)
WBC # FLD AUTO: 11.46 K/UL — HIGH (ref 4.8–10.8)

## 2021-02-06 PROCEDURE — 71046 X-RAY EXAM CHEST 2 VIEWS: CPT | Mod: 26

## 2021-02-06 PROCEDURE — 99285 EMERGENCY DEPT VISIT HI MDM: CPT

## 2021-02-06 PROCEDURE — 93010 ELECTROCARDIOGRAM REPORT: CPT

## 2021-02-06 RX ORDER — ASPIRIN/CALCIUM CARB/MAGNESIUM 324 MG
325 TABLET ORAL ONCE
Refills: 0 | Status: COMPLETED | OUTPATIENT
Start: 2021-02-06 | End: 2021-02-06

## 2021-02-06 RX ORDER — METOPROLOL TARTRATE 50 MG
100 TABLET ORAL ONCE
Refills: 0 | Status: COMPLETED | OUTPATIENT
Start: 2021-02-06 | End: 2021-02-06

## 2021-02-06 RX ADMIN — Medication 325 MILLIGRAM(S): at 22:48

## 2021-02-06 RX ADMIN — Medication 100 MILLIGRAM(S): at 23:48

## 2021-02-06 NOTE — ED PROVIDER NOTE - WR ORDER ID 1
Diabetes Instructions:  - start LANTUS INSULIN 12 units every morning  - take metformin 1000mg twice a day  - take glipizide XL once a day  - take Tradjenta once a day    Check blood sugar at least TWICE a day: every morning when you wake up and at bedtime. Keep a record of your values and bring this or your glucometer with you to your next visit. Goal blood sugars: 80 to 180  Notify me if you have blood sugars less than 80      Diet instructions:  Reduce the amount of carbohydrates in your diet. This means not just avoiding sweets, sodas, or desserts but also reducing the amount of bread, pasta, rice, potatoes, corn, and crackers that you eat. Do not have more than one serving of carbs per meal (for example: do not eat a sandwich and potato chips in the same meal). Focus on eating mostly protein (meat, poultry, fish, shellfish, eggs), healthy fats (avocados, nuts, cheese, olive or coconut oil) and non-starchy vegetables (greens, carrots, tomatoes, bell peppers, broccoli, brussels sprouts, green beans, etc). If you fill yourself up with these foods, you won't even want the carbs. [id number]

## 2021-02-06 NOTE — ED ADULT NURSE NOTE - NSIMPLEMENTINTERV_GEN_ALL_ED
Implemented All Fall with Harm Risk Interventions:  [place] to call system. Call bell, personal items and telephone within reach. Instruct patient to call for assistance. Room bathroom lighting operational. Non-slip footwear when patient is off stretcher. Physically safe environment: no spills, clutter or unnecessary equipment. Stretcher in lowest position, wheels locked, appropriate side rails in place. Provide visual cue, wrist band, yellow gown, etc. Monitor gait and stability. Monitor for mental status changes and reorient to person, place, and time. Review medications for side effects contributing to fall risk. Reinforce activity limits and safety measures with patient and family. Provide visual clues: red socks.

## 2021-02-06 NOTE — ED PROVIDER NOTE - CLINICAL SUMMARY MEDICAL DECISION MAKING FREE TEXT BOX
Patient remained stable in ED, labs/CXR/EKG findings reviewed and discussed with patient. Discussed with EDOU/OBS provider, patient is admitted to EDOU for further evaluation of her symptoms.

## 2021-02-06 NOTE — ED CDU PROVIDER INITIAL DAY NOTE - ATTENDING CONTRIBUTION TO CARE
Pt presents with a few day history of left sided chest pain that radiates to the left arm. No associated nausea, vomiting or diaphoresis. On exam S1S2 rrr, lungs clear, abdomen is soft nontender, ext neg for edema or tenderness. No rash.

## 2021-02-06 NOTE — ED CDU PROVIDER INITIAL DAY NOTE - OBJECTIVE STATEMENT
45 yold male to ED Pmhx Htn, right eye blindness(childhood accident) c/o Left side chest pain described as "soreness" intermittently x 1 week; pt also radiatin to left arm and back - deneis sob,n/v, diaphoresis, fever, chills, cough; pt with similar pain ~6 months ago evaluated in another Ed but no specific provocative testing done;   Pt denies Dm,HLd, fmhx cad; + smoker; denies cocaine or viagra use

## 2021-02-06 NOTE — ED PROVIDER NOTE - ATTENDING CONTRIBUTION TO CARE
Patient is c/o Lt upper ext pain associated with Lt upper back and chest pain, denies neck pain, denies HA, denies f/c/n/v. Denies trauma. Patient has been taking OTC meds and had not been helping, had called PMD when symptoms started, was told to go to ED, but did not come at that time, symptoms continued, so his son advised to go to ED and brought him to ED. Patient denies abd pain, denies n/v/f/c/COVID-19 symptoms. Patient with h/o HTN, BMI >30, smokes cigarettes, 1/2 PPD x 20 yrs. LUE pain is non-reproducible, denies any aggravating or relieving factors.   Vitals reviewed.   Patient is awake, alert, o x 3, speaking in full sentences, appears comfortable and is not in distress.   lungs: CTA  abd: +BS, NT, ND, soft  Ext: no E/E/C: pulse+; no swelling, no erythema, no crepitus, full ROM of the LUE noted, no pain on palpation or ROM, no tenderness on palpation, distally NVI.   CNS: awake, alert, o x 3, no focal neurologic deficits.   A/P: Lt sided chest/upper back/LUE pain, no trauma, non-reproducible.   Patient with CAD risk factors,  labs, EKG, CXR, reevaluation.

## 2021-02-06 NOTE — ED ADULT TRIAGE NOTE - CHIEF COMPLAINT QUOTE
Pt started of  intermittent left forearm pain x 1 week that radiates to his left chest. Pt was laying down when pain started. Denies sob. fever. Pt c/o of  constant left forearm pain x 1 week that radiates to his left chest. " It feels like pressure inside my chest."  Pt was laying down when pain started. Denies sob. fever.

## 2021-02-06 NOTE — ED CDU PROVIDER INITIAL DAY NOTE - PROGRESS NOTE DETAILS
received signout from Syd Lynch pt in obs for eval of chest pain; rvp sent; toprol given; will continue to monitor.

## 2021-02-06 NOTE — ED CDU PROVIDER INITIAL DAY NOTE - PHYSICAL EXAMINATION
Constitutional: Well developed, well nourished. NAD  Head: Normocephalic, atraumatic.  Eyes: PERRL, EOMI.  ENT: No nasal discharge. Mucous membranes dry.  Neck: Supple. Painless ROM.  Cardiovascular:  Regular rate and rhythm.    Pulmonary:   Lungs clear to auscultation bilaterally.    Abdominal: Soft. Nondistended. No rebound, guarding, rigidity.  Extremities. Pelvis stable. No lower extremity edema, symmetric calves.  Skin: No rashes, cyanosis.  Neuro: AAOx3. No focal neurological deficits.  Psych: Normal mood. Normal affect.

## 2021-02-06 NOTE — ED PROVIDER NOTE - PHYSICAL EXAMINATION
Physical Exam    Vital Signs: I have reviewed the initial vital signs.  Constitutional: well-nourished, appears stated age, no acute distress  Eyes: Conjunctiva pink, Sclera clear.   Cardiovascular: S1 and S2, regular rate, regular rhythm, well-perfused extremities, radial pulses equal and 2+ b/l.   Respiratory: unlabored respiratory effort, clear to auscultation bilaterally no wheezing, rales and rhonchi. pt is speaking full sentences. no accessory muscle use. no reproducible chest tenderness or chest wall crepitus.   Gastrointestinal: soft, non-tender, nondistended abdomen, no pulsatile mass, normal bowl sounds, no rebound, no guarding   Musculoskeletal: supple neck, no lower extremity edema, no calf tenderness, FROM of b/l upper and lower extremities without pain.   Integumentary: warm, dry, no rash  Neurologic: awake, alert, cranial nerves II-XII grossly intact, extremities’ motor and sensory functions grossly intact.  Psychiatric: appropriate mood, appropriate affect

## 2021-02-06 NOTE — ED ADULT NURSE NOTE - CAS ELECT INFOMATION PROVIDED
Patient discharged by provider with instructions.  Pt iv d/c by RN.  Patient leaving ED ambulatory./DC instructions

## 2021-02-06 NOTE — ED ADULT NURSE NOTE - OBJECTIVE STATEMENT
Pt c/o of  constant left forearm pain x 1 week that radiates to his left chest. " It feels like pressure inside my chest."  Pt was laying down when pain started. Denies sob. fever.

## 2021-02-06 NOTE — ED PROVIDER NOTE - OBJECTIVE STATEMENT
46 y/o male with a PMH of HTN and cigarette smoking presents to the ED for evaluation of sharp intermittent left forearm pain radiating to the left chest x 1 week. pt reports he was seen by a cardiologist about 4 months ago for similar pain, no stress test or CCTA performed. pt denies fever, chills, cough, dizziness, neck pain, jaw pain, sweating, abdominal pain, n/v/d/c, rashes, leg pain, leg swelling, recent trauma, recent travel, recent hospitalizations, recent sick contacts, illicit drug use, excessive alcohol use, or use of male enhancement drugs.

## 2021-02-07 VITALS
OXYGEN SATURATION: 95 % | SYSTOLIC BLOOD PRESSURE: 121 MMHG | HEART RATE: 55 BPM | DIASTOLIC BLOOD PRESSURE: 76 MMHG | RESPIRATION RATE: 18 BRPM | TEMPERATURE: 98 F

## 2021-02-07 PROBLEM — S82.899A OTHER FRACTURE OF UNSPECIFIED LOWER LEG, INITIAL ENCOUNTER FOR CLOSED FRACTURE: Chronic | Status: ACTIVE | Noted: 2020-06-18

## 2021-02-07 LAB
RAPID RVP RESULT: SIGNIFICANT CHANGE UP
SARS-COV-2 RNA SPEC QL NAA+PROBE: SIGNIFICANT CHANGE UP
TROPONIN T SERPL-MCNC: <0.01 NG/ML — SIGNIFICANT CHANGE UP

## 2021-02-07 PROCEDURE — 75574 CT ANGIO HRT W/3D IMAGE: CPT | Mod: 26

## 2021-02-07 PROCEDURE — 99236 HOSP IP/OBS SAME DATE HI 85: CPT

## 2021-02-07 PROCEDURE — 93010 ELECTROCARDIOGRAM REPORT: CPT

## 2021-02-07 RX ORDER — ASPIRIN/CALCIUM CARB/MAGNESIUM 324 MG
1 TABLET ORAL
Qty: 30 | Refills: 0
Start: 2021-02-07 | End: 2021-03-08

## 2021-02-07 RX ORDER — SIMVASTATIN 20 MG/1
5 TABLET, FILM COATED ORAL
Qty: 30 | Refills: 0
Start: 2021-02-07 | End: 2021-03-08

## 2021-02-07 RX ORDER — ADENOSINE 3 MG/ML
60 INJECTION INTRAVENOUS ONCE
Refills: 0 | Status: DISCONTINUED | OUTPATIENT
Start: 2021-02-07 | End: 2021-02-07

## 2021-02-07 NOTE — ED CDU PROVIDER DISPOSITION NOTE - CLINICAL COURSE
Pt presented with chest pains. Placed into observation for evaluation. CE neg. CCTA showed mild disease. Pts cardiologist Dr. Falcon contacted who advised statin, aspirin and follow up in  the office for further care.

## 2021-02-07 NOTE — ED CDU PROVIDER SUBSEQUENT DAY NOTE - PROGRESS NOTE DETAILS
Discussed case w/ Dr. Falcon.  States pt can f/u as out pt this week to schedule cath. continue home therapy. Discussed results with pt.  All questions were answered and return precautions discussed.  Pt is asx and comfortable at this time.  Unremarkable re-exam.  No further concerns at this time from pt.  Will follow up with PMD.  Pt understands and agrees with tx plan.  strict return precautions discussed w/ pt. understands no strenuous activity.

## 2021-02-07 NOTE — CONSULT NOTE ADULT - SUBJECTIVE AND OBJECTIVE BOX
HPI:      PAST MEDICAL & SURGICAL HISTORY  Ankle fracture  left    Eye problem  right eye accident age 2 &quot;lost vision&quot;    Hypertension  not taking meds for 4-5 months &quot;i ran out&quot;    History of exploratory laparotomy    History of appendectomy    History of cholecystectomy        FAMILY HISTORY:  FAMILY HISTORY:  No pertinent family history in first degree relatives        SOCIAL HISTORY:  []smoker  []Alcohol  []Drug    ALLERGIES:  No Known Allergies      MEDICATIONS:  MEDICATIONS  (STANDING):    MEDICATIONS  (PRN):      HOME MEDICATIONS:  Home Medications:  losartan-hydrochlorothiazide 100mg-12.5mg oral tablet: 1 tab(s) orally once a day (18 Jun 2020 08:41)  Tylenol 325 mg oral capsule: 1 cap(s) orally 3 times a day, As Needed (18 Jun 2020 08:41)      VITALS:   T(F): 97.6 (02-07 @ 08:07), Max: 98.2 (02-06 @ 20:03)  HR: 55 (02-07 @ 08:07) (55 - 97)  BP: 121/76 (02-07 @ 08:07) (109/71 - 147/89)  BP(mean): 89 (02-07 @ 01:06) (89 - 94)  RR: 18 (02-07 @ 08:07) (16 - 18)  SpO2: 95% (02-07 @ 08:07) (95% - 99%)    I&O's Summary      REVIEW OF SYSTEMS:  CONSTITUTIONAL: No weakness, fevers or chills  EYES: No visual changes  ENT: No vertigo or throat pain   NECK: No pain or stiffness  RESPIRATORY: No cough, wheezing, hemoptysis; No shortness of breath  CARDIOVASCULAR: No chest pain or palpitations  GASTROINTESTINAL: No abdominal or epigastric pain. No nausea, vomiting, or hematemesis; No diarrhea or constipation. No melena or hematochezia.  GENITOURINARY: No dysuria, frequency or hematuria  NEUROLOGICAL: No numbness or weakness  SKIN: No itching, no rashes  MSK: No pain    PHYSICAL EXAM:  NEURO: patient is awake , alert and oriented  GEN: Not in acute distress  NECK: no thyroid enlargement, no JVD  LUNGS: Clear to auscultation bilaterally   CARDIOVASCULAR: S1/S2 present, RRR , no murmurs or rubs, no carotid bruits,  + PP bilaterally  ABD: Soft, non-tender, non-distended, +BS  EXT: No BRAD  SKIN: Intact    LABS:                        13.0   11.46 )-----------( 288      ( 06 Feb 2021 21:10 )             37.9     02-06    139  |  103  |  19  ----------------------------<  106<H>  3.9   |  24  |  1.1    Ca    9.1      06 Feb 2021 21:10  Mg     1.9     02-06    TPro  7.0  /  Alb  4.0  /  TBili  <0.2  /  DBili  x   /  AST  17  /  ALT  15  /  AlkPhos  80  02-06      Troponin T, Serum: <0.01 ng/mL (02-07-21 @ 01:15)  Creatine Kinase, Serum: 261 U/L <H> (02-06-21 @ 21:51)  Troponin T, Serum: <0.01 ng/mL (02-06-21 @ 21:10)    CARDIAC MARKERS ( 07 Feb 2021 01:15 )  x     / <0.01 ng/mL / x     / x     / x      CARDIAC MARKERS ( 06 Feb 2021 21:51 )  x     / x     / 261 U/L / x     / x      CARDIAC MARKERS ( 06 Feb 2021 21:10 )  x     / <0.01 ng/mL / x     / x     / x        RADIOLOGY:  -CXR: no acute pathology    -TTE:  -CCTA: < from: CT Angio Heart and Coronaries w/ IV Cont (02.07.21 @ 08:32) >  IMPRESSION:    1.  Multifocal LAD eccentric calcified plaques with focal mild to moderate mid LAD stenosis.  2.  Eccentric calcified plaques proximal left circumflex artery with focal mild stenosis.  3. Eccentric calcified plaque left main coronary artery without flow-limiting stenosis.  4. Total coronary calcium score is 235. Total calcium volume is 205.  For a 47 year old male , this corresponds to 97 percentile.    5. Focal right middle lobe opacity, likely infectious/inflammatory.    < end of copied text >    -STRESS TEST:  -CATHETERIZATION:    ECG:  sinus at 87bpm. no ischemic changes    TELEMETRY EVENTS:   HPI:  46 yo male patient with PMHx of HTN, prsenting for evaluation of left arm pain.  Patient states that the pain started around a week ago, left forearm, radiating to left arm and shoulder and intermittently to left axillary area/chest, not related to exertion, worse with physical movement of the arm, continuous.  no retrosternal chest pain, dyspnea, pnd, orthopnea, palpitations..    PAST MEDICAL & SURGICAL HISTORY  Ankle fracture  left    Eye problem  right eye accident age 2 &quot;lost vision&quot;    Hypertension  not taking meds for 4-5 months &quot;i ran out&quot;    History of exploratory laparotomy    History of appendectomy    History of cholecystectomy        FAMILY HISTORY:  FAMILY HISTORY:  No pertinent family history in first degree relatives        SOCIAL HISTORY:  [x]smoker  []Alcohol  []Drug    ALLERGIES:  No Known Allergies      MEDICATIONS:  MEDICATIONS  (STANDING):    MEDICATIONS  (PRN):      HOME MEDICATIONS:  Home Medications:  losartan-hydrochlorothiazide 100mg-12.5mg oral tablet: 1 tab(s) orally once a day (18 Jun 2020 08:41)  Tylenol 325 mg oral capsule: 1 cap(s) orally 3 times a day, As Needed (18 Jun 2020 08:41)      VITALS:   T(F): 97.6 (02-07 @ 08:07), Max: 98.2 (02-06 @ 20:03)  HR: 55 (02-07 @ 08:07) (55 - 97)  BP: 121/76 (02-07 @ 08:07) (109/71 - 147/89)  BP(mean): 89 (02-07 @ 01:06) (89 - 94)  RR: 18 (02-07 @ 08:07) (16 - 18)  SpO2: 95% (02-07 @ 08:07) (95% - 99%)    I&O's Summary      REVIEW OF SYSTEMS:  CONSTITUTIONAL: No weakness, fevers or chills  EYES: No visual changes  ENT: No vertigo or throat pain   NECK: No pain or stiffness  RESPIRATORY: No cough, wheezing, hemoptysis; No shortness of breath  CARDIOVASCULAR: No chest pain or palpitations  GASTROINTESTINAL: No abdominal or epigastric pain. No nausea, vomiting, or hematemesis; No diarrhea or constipation. No melena or hematochezia.  GENITOURINARY: No dysuria, frequency or hematuria  NEUROLOGICAL: No numbness or weakness  SKIN: No itching, no rashes  MSK: left arm pain    PHYSICAL EXAM:  NEURO: patient is awake , alert and oriented  GEN: Not in acute distress  NECK: no thyroid enlargement, no JVD  LUNGS: Clear to auscultation bilaterally   CARDIOVASCULAR: S1/S2 present, RRR , no murmurs or rubs, no carotid bruits,  + PP bilaterally  ABD: Soft, non-tender, non-distended, +BS  EXT: No BRAD  SKIN: Intact    LABS:                        13.0   11.46 )-----------( 288      ( 06 Feb 2021 21:10 )             37.9     02-06    139  |  103  |  19  ----------------------------<  106<H>  3.9   |  24  |  1.1    Ca    9.1      06 Feb 2021 21:10  Mg     1.9     02-06    TPro  7.0  /  Alb  4.0  /  TBili  <0.2  /  DBili  x   /  AST  17  /  ALT  15  /  AlkPhos  80  02-06      Troponin T, Serum: <0.01 ng/mL (02-07-21 @ 01:15)  Creatine Kinase, Serum: 261 U/L <H> (02-06-21 @ 21:51)  Troponin T, Serum: <0.01 ng/mL (02-06-21 @ 21:10)    CARDIAC MARKERS ( 07 Feb 2021 01:15 )  x     / <0.01 ng/mL / x     / x     / x      CARDIAC MARKERS ( 06 Feb 2021 21:51 )  x     / x     / 261 U/L / x     / x      CARDIAC MARKERS ( 06 Feb 2021 21:10 )  x     / <0.01 ng/mL / x     / x     / x        RADIOLOGY:  -CXR: no acute pathology    -TTE:  -CCTA: < from: CT Angio Heart and Coronaries w/ IV Cont (02.07.21 @ 08:32) >  IMPRESSION:    1.  Multifocal LAD eccentric calcified plaques with focal mild to moderate mid LAD stenosis.  2.  Eccentric calcified plaques proximal left circumflex artery with focal mild stenosis.  3. Eccentric calcified plaque left main coronary artery without flow-limiting stenosis.  4. Total coronary calcium score is 235. Total calcium volume is 205.  For a 47 year old male , this corresponds to 97 percentile.    5. Focal right middle lobe opacity, likely infectious/inflammatory.    < end of copied text >    -STRESS TEST:  -CATHETERIZATION:    ECG:  sinus at 87bpm. no ischemic changes    TELEMETRY EVENTS:

## 2021-02-07 NOTE — ED CDU PROVIDER DISPOSITION NOTE - PATIENT PORTAL LINK FT
You can access the FollowMyHealth Patient Portal offered by [facility] by registering at the following website: [email or web address]. By joining [employer]’s FollowMyHealth portal, you will also be able to view your health information using other applications (apps) compatible with our system.

## 2021-02-07 NOTE — CONSULT NOTE ADULT - ASSESSMENT
IMPRESSION:  -   - CCTA showing mid LAD mild to moderate stenosis, and prox LCx mild stenosis    PLAN:  -  IMPRESSION:  - Atypical pain - unlikely cardiac (more musculoskeletal...)  - CCTA showing mid LAD mild to moderate stenosis, and prox LCx mild stenosis    PLAN:  - will discuss with attending the need for further cardiac work-up (Stress test vs cath; inpatient vs outpatient)  - medical therapy with aspirin and statin  - will follow IMPRESSION:  - Atypical pain - unlikely cardiac (more musculoskeletal...)  - CCTA showing mid LAD mild to moderate stenosis, and prox LCx mild stenosis    PLAN:  - Follow up outpatient   - medical therapy with aspirin and statin

## 2021-02-07 NOTE — ED ADULT NURSE REASSESSMENT NOTE - NS ED NURSE REASSESS COMMENT FT1
pt alert and oriented. ambulates steadily, . pt educated on the risk and benefits of fall precautions, pt continued to refuse fall precautions. denies chest pain, SOB, PALPS at this time. all nursing care rendered, cardiac monitoring continued will continue to monitor

## 2021-02-07 NOTE — ED CDU PROVIDER DISPOSITION NOTE - NSFOLLOWUPINSTRUCTIONS_ED_ALL_ED_FT
Chest Pain    Chest pain can be caused by many different conditions which may or may not be dangerous. Causes include heartburn, lung infections, heart attack, blood clot in lungs, skin infections, strain or damage to muscle, cartilage, or bones, etc. In addition to a history and physical examination, an electrocardiogram (ECG) or other lab tests may have been performed to determine the cause of your chest pain. Follow up with your primary care provider or with a cardiologist as instructed.     SEEK IMMEDIATE MEDICAL CARE IF YOU HAVE ANY OF THE FOLLOWING SYMPTOMS: worsening chest pain, coughing up blood, unexplained back/neck/jaw pain, severe abdominal pain, dizziness or lightheadedness, fainting, shortness of breath, sweaty or clammy skin, vomiting, or racing heart beat. These symptoms may represent a serious problem that is an emergency. Do not wait to see if the symptoms will go away. Get medical help right away. Call 911 and do not drive yourself to the hospital.    Please do not engage in strenuous activity. DO NOT SHOVEL SNOW.    Follow up with your primary medical doctor in 1-2 days

## 2021-02-07 NOTE — ED ADULT NURSE REASSESSMENT NOTE - NS ED NURSE REASSESS COMMENT FT1
Pt OBS in ER, second trop drawn and sent to lab, pt moved to the back of the ER as pending CCTA. Pt updated on continuing plan of care, awaiting further orders, will continue to monitor/assess. Pt hand off report endorsed to Kelsi HARVEY RN. Pt OBS in ER, second trop drawn and sent to lab, pt moved to the back of the ER. Pt updated on continuing plan of care, awaiting further orders, will continue to monitor/assess. Pt hand off report endorsed to Kelsi HARVEY RN.

## 2021-02-07 NOTE — ED CDU PROVIDER DISPOSITION NOTE - CARE PROVIDER_API CALL
Denise Lynch)  Cardiovascular Disease; Interventional Cardiology  [address] [place] [address]  Phone: [phone]  Fax: [phone]  Follow Up Time: 1-3 Days

## 2021-03-12 ENCOUNTER — OUTPATIENT (OUTPATIENT)
Dept: OUTPATIENT SERVICES | Facility: HOSPITAL | Age: 46
LOS: 1 days | Discharge: HOME | End: 2021-03-12

## 2021-03-12 DIAGNOSIS — Z98.890 OTHER SPECIFIED POSTPROCEDURAL STATES: Chronic | ICD-10-CM

## 2021-03-12 DIAGNOSIS — Z90.49 ACQUIRED ABSENCE OF OTHER SPECIFIED PARTS OF DIGESTIVE TRACT: Chronic | ICD-10-CM

## 2021-03-12 LAB
ANION GAP SERPL CALC-SCNC: 12 MMOL/L — SIGNIFICANT CHANGE UP (ref 7–14)
BUN SERPL-MCNC: 14 MG/DL — SIGNIFICANT CHANGE UP (ref 10–20)
CALCIUM SERPL-MCNC: 8.9 MG/DL — SIGNIFICANT CHANGE UP (ref 8.5–10.1)
CHLORIDE SERPL-SCNC: 103 MMOL/L — SIGNIFICANT CHANGE UP (ref 98–110)
CO2 SERPL-SCNC: 22 MMOL/L — SIGNIFICANT CHANGE UP (ref 17–32)
CREAT SERPL-MCNC: 1.1 MG/DL — SIGNIFICANT CHANGE UP (ref 0.7–1.5)
GLUCOSE SERPL-MCNC: 91 MG/DL — SIGNIFICANT CHANGE UP (ref 70–99)
HCT VFR BLD CALC: 41.9 % — LOW (ref 42–52)
HGB BLD-MCNC: 13.9 G/DL — LOW (ref 14–18)
MCHC RBC-ENTMCNC: 28.6 PG — SIGNIFICANT CHANGE UP (ref 27–31)
MCHC RBC-ENTMCNC: 33.2 G/DL — SIGNIFICANT CHANGE UP (ref 32–37)
MCV RBC AUTO: 86.2 FL — SIGNIFICANT CHANGE UP (ref 80–94)
NRBC # BLD: 0 /100 WBCS — SIGNIFICANT CHANGE UP (ref 0–0)
PLATELET # BLD AUTO: 308 K/UL — SIGNIFICANT CHANGE UP (ref 130–400)
POTASSIUM SERPL-MCNC: 4.1 MMOL/L — SIGNIFICANT CHANGE UP (ref 3.5–5)
POTASSIUM SERPL-SCNC: 4.1 MMOL/L — SIGNIFICANT CHANGE UP (ref 3.5–5)
RBC # BLD: 4.86 M/UL — SIGNIFICANT CHANGE UP (ref 4.7–6.1)
RBC # FLD: 13.4 % — SIGNIFICANT CHANGE UP (ref 11.5–14.5)
SODIUM SERPL-SCNC: 137 MMOL/L — SIGNIFICANT CHANGE UP (ref 135–146)
WBC # BLD: 8.39 K/UL — SIGNIFICANT CHANGE UP (ref 4.8–10.8)
WBC # FLD AUTO: 8.39 K/UL — SIGNIFICANT CHANGE UP (ref 4.8–10.8)

## 2021-03-12 RX ORDER — METOPROLOL TARTRATE 50 MG
0.5 TABLET ORAL
Qty: 30 | Refills: 1
Start: 2021-03-12 | End: 2021-05-10

## 2021-03-12 RX ORDER — ATORVASTATIN CALCIUM 80 MG/1
1 TABLET, FILM COATED ORAL
Qty: 30 | Refills: 1
Start: 2021-03-12 | End: 2021-05-10

## 2021-03-12 RX ORDER — ACETAMINOPHEN 500 MG
1 TABLET ORAL
Qty: 0 | Refills: 0 | DISCHARGE

## 2021-03-12 RX ORDER — LOSARTAN/HYDROCHLOROTHIAZIDE 100MG-25MG
1 TABLET ORAL
Qty: 0 | Refills: 0 | DISCHARGE

## 2021-03-12 RX ORDER — LOSARTAN POTASSIUM 100 MG/1
100 TABLET, FILM COATED ORAL ONCE
Refills: 0 | Status: DISCONTINUED | OUTPATIENT
Start: 2021-03-12 | End: 2021-03-26
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
## 2021-03-12 NOTE — CHART NOTE - NSCHARTNOTEFT_GEN_A_CORE
PRE-OP DIAGNOSIS: suspected CAD, abnormal CCTA    PROCEDURE:[ x] LHC                         [ x] Coronary angiography                         [ ] [facility]  Physician: Dr. Nava  Assistant: Jaleesa Mott    ANESTHESIA TYPE:  [  ]General Anesthesia  [ x ] Sedation  [  ] Local/Regional    ESTIMATED BLOOD LOSS:    10   mL    CONDITION  [ ] Critical  [ ] Serious  [ ]Fair  [x ]Good    IV CONTRAST:  75   mL    FINDINGS:  Left Heart Catheterization:  LVEF%: 60%  LVEDP:  26 mmHg  [ ] Normal Coronary Arteries  [ ] Luminal Irregularities  [ ] Non-obstructive CAD  [x ] 1 vessel coronary artery disease (Distal LCX, OM2)    ACCESS:    [x ] right radial artery (D-stat)  [ ] right femoral artery    LEFT HEART CATHETERIZATION                                    Left main: normal   LAD:   prox LAD: minor irregularities  mid LAD: minor irregularities, tortuous vessel  distal LAD: mild disease    Left Circumflex:   prox LCX: mild disease  distal LCX: 100% occluded, fills distally via right to left and left to left collaterals    OM1: small to medium sized, mild disease  OM2: 100% occluded, fills distally via left to left and right to left collaterals    Right Coronary Artery: minor irregularities  RPDA: minor irregularities    Ramus Intermed: medium sized, minor irregularities    INTERVENTION: none  IMPLANTS: none    SPECIMEN REMOVED: N/A    POST-OP DIAGNOSIS: 1 vessel disease (distal LCX / OM2)    PLAN OF CARE  [x] D/C Home today  [x] Continue ASA 81mg daily  Start Lipitor 40mg QHS, stop Zocor  Start Lopressor 12.5mg PO Q12  NS@100cc/hr x 3 hour

## 2021-03-12 NOTE — ASU PATIENT PROFILE, ADULT - VISION (WITH CORRECTIVE LENSES IF THE PATIENT USUALLY WEARS THEM):
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

## 2021-03-16 NOTE — ED ADULT TRIAGE NOTE - HEIGHT IN INCHES
Medical Necessity Information: It is in your best interest to select a reason for this procedure from the list below. All of these items fulfill various CMS LCD requirements except the new and changing color options.
Include Z78.9 (Other Specified Conditions Influencing Health Status) As An Associated Diagnosis?: No
Medical Necessity Clause: This procedure was medically necessary because the lesion that was treated was:
2
Detail Level: Detailed
Size Of Lesion In Cm: 0.5
X Size Of Lesion In Cm (Optional): 0
Anesthesia Type: 1% lidocaine with epinephrine
Anesthesia Volume In Cc: 1
Hemostasis: Aluminum Chloride
Wound Care: Petrolatum
Path Notes (To The Dermatopathologist): Please check margins.
Consent was obtained from the patient. The risks and benefits to therapy were discussed in detail. Specifically, the risks of infection, scarring, bleeding, prolonged wound healing, incomplete removal, allergy to anesthesia, nerve injury and recurrence were addressed. Prior to the procedure, the treatment site was clearly identified and confirmed by the patient. All components of Universal Protocol/PAUSE Rule completed.
Post-Care Instructions: I reviewed with the patient in detail post-care instructions. Patient is to keep the biopsy site dry overnight, and then apply bacitracin twice daily until healed. Patient may apply hydrogen peroxide soaks to remove any crusting.

## 2021-03-18 DIAGNOSIS — I20.8 OTHER FORMS OF ANGINA PECTORIS: ICD-10-CM

## 2021-03-18 DIAGNOSIS — Z87.891 PERSONAL HISTORY OF NICOTINE DEPENDENCE: ICD-10-CM

## 2021-03-18 DIAGNOSIS — I10 ESSENTIAL (PRIMARY) HYPERTENSION: ICD-10-CM

## 2021-03-18 DIAGNOSIS — H54.7 UNSPECIFIED VISUAL LOSS: ICD-10-CM

## 2021-03-18 DIAGNOSIS — E66.01 MORBID (SEVERE) OBESITY DUE TO EXCESS CALORIES: ICD-10-CM

## 2021-03-18 DIAGNOSIS — Z79.82 LONG TERM (CURRENT) USE OF ASPIRIN: ICD-10-CM

## 2021-03-18 DIAGNOSIS — I25.10 ATHEROSCLEROTIC HEART DISEASE OF NATIVE CORONARY ARTERY WITHOUT ANGINA PECTORIS: ICD-10-CM

## 2021-05-24 NOTE — ASU PREOP CHECKLIST - SPO2 (%)
[Well Developed] : well developed [Well Nourished] : well nourished [No Acute Distress] : no acute distress [Normal Conjunctiva] : normal conjunctiva 98 [Normal Venous Pressure] : normal venous pressure [No Carotid Bruit] : no carotid bruit [Normal S1, S2] : normal S1, S2 [No Murmur] : no murmur [No Rub] : no rub [No Gallop] : no gallop [Clear Lung Fields] : clear lung fields [Good Air Entry] : good air entry [No Respiratory Distress] : no respiratory distress  [Soft] : abdomen soft [Non Tender] : non-tender [No Masses/organomegaly] : no masses/organomegaly [Normal Bowel Sounds] : normal bowel sounds [Normal Gait] : normal gait [No Edema] : no edema [No Cyanosis] : no cyanosis [No Clubbing] : no clubbing [No Varicosities] : no varicosities [No Rash] : no rash [No Skin Lesions] : no skin lesions [Moves all extremities] : moves all extremities [No Focal Deficits] : no focal deficits [Normal Speech] : normal speech [Alert and Oriented] : alert and oriented [Normal memory] : normal memory

## 2022-06-01 NOTE — ED PROVIDER NOTE - NSTIMEPROVIDERCAREINITIATE_GEN_ER
05-Jun-2020 12:00 Mastoid Interpolation Flap Text: A decision was made to reconstruct the defect utilizing an interpolation axial flap and a staged reconstruction.  A telfa template was made of the defect.  This telfa template was then used to outline the mastoid interpolation flap.  The donor area for the pedicle flap was then injected with anesthesia.  The flap was excised through the skin and subcutaneous tissue down to the layer of the underlying musculature.  The pedicle flap was carefully excised within this deep plane to maintain its blood supply.  The edges of the donor site were undermined.   The donor site was closed in a primary fashion.  The pedicle was then rotated into position and sutured.  Once the tube was sutured into place, adequate blood supply was confirmed with blanching and refill.  The pedicle was then wrapped with xeroform gauze and dressed appropriately with a telfa and gauze bandage to ensure continued blood supply and protect the attached pedicle.

## 2025-07-03 NOTE — ED CDU PROVIDER INITIAL DAY NOTE - CHIEF COMPLAINT
The patient is a 45y Male complaining of chest pain.
Spine appears normal, range of motion is not limited, no muscle or joint tenderness